# Patient Record
Sex: FEMALE | Race: WHITE | NOT HISPANIC OR LATINO | Employment: FULL TIME | URBAN - NONMETROPOLITAN AREA
[De-identification: names, ages, dates, MRNs, and addresses within clinical notes are randomized per-mention and may not be internally consistent; named-entity substitution may affect disease eponyms.]

---

## 2023-04-10 PROBLEM — E66.9 OBESITY: Status: ACTIVE | Noted: 2023-04-10

## 2023-04-10 PROBLEM — F41.9 ANXIETY: Status: ACTIVE | Noted: 2023-04-10

## 2023-04-10 PROBLEM — R73.9 HYPERGLYCEMIA: Status: ACTIVE | Noted: 2023-04-10

## 2023-04-10 PROBLEM — G56.00 CARPAL TUNNEL SYNDROME: Status: RESOLVED | Noted: 2023-04-10 | Resolved: 2023-04-10

## 2023-04-10 PROBLEM — D68.51 FACTOR V LEIDEN MUTATION (MULTI): Status: ACTIVE | Noted: 2023-04-10

## 2023-04-10 PROBLEM — T78.1XXA FOOD SENSITIVITY WITH GASTROINTESTINAL SYMPTOMS: Status: ACTIVE | Noted: 2023-04-10

## 2023-04-10 PROBLEM — I80.01 THROMBOPHLEBITIS OF SUPERFICIAL VEINS OF RIGHT LOWER EXTREMITY: Status: ACTIVE | Noted: 2023-04-10

## 2023-04-10 PROBLEM — E78.00 HYPERCHOLESTEROLEMIA: Status: ACTIVE | Noted: 2023-04-10

## 2023-04-10 PROBLEM — S62.609A FINGER FRACTURE, RIGHT: Status: ACTIVE | Noted: 2023-04-10

## 2023-04-10 PROBLEM — Z86.32 HISTORY OF GESTATIONAL DIABETES: Status: ACTIVE | Noted: 2023-04-10

## 2023-04-10 RX ORDER — ESCITALOPRAM OXALATE 10 MG/1
10 TABLET ORAL DAILY
COMMUNITY
Start: 2022-07-19 | End: 2023-04-12 | Stop reason: SDUPTHER

## 2023-04-12 ENCOUNTER — OFFICE VISIT (OUTPATIENT)
Dept: PRIMARY CARE | Facility: CLINIC | Age: 38
End: 2023-04-12
Payer: COMMERCIAL

## 2023-04-12 VITALS
BODY MASS INDEX: 36.46 KG/M2 | SYSTOLIC BLOOD PRESSURE: 118 MMHG | HEART RATE: 64 BPM | HEIGHT: 67 IN | DIASTOLIC BLOOD PRESSURE: 80 MMHG | WEIGHT: 232.3 LBS

## 2023-04-12 DIAGNOSIS — F41.9 ANXIETY: ICD-10-CM

## 2023-04-12 DIAGNOSIS — L98.9 LESION OF SKIN OF SCALP: ICD-10-CM

## 2023-04-12 DIAGNOSIS — I80.01 THROMBOPHLEBITIS OF SUPERFICIAL VEINS OF RIGHT LOWER EXTREMITY: ICD-10-CM

## 2023-04-12 DIAGNOSIS — G56.03 BILATERAL CARPAL TUNNEL SYNDROME: Primary | ICD-10-CM

## 2023-04-12 DIAGNOSIS — E66.9 OBESITY (BMI 35.0-39.9 WITHOUT COMORBIDITY): ICD-10-CM

## 2023-04-12 DIAGNOSIS — E78.00 HYPERCHOLESTEROLEMIA: ICD-10-CM

## 2023-04-12 DIAGNOSIS — R73.9 HYPERGLYCEMIA: ICD-10-CM

## 2023-04-12 PROBLEM — S62.609A FINGER FRACTURE, RIGHT: Status: RESOLVED | Noted: 2023-04-10 | Resolved: 2023-04-12

## 2023-04-12 PROCEDURE — 99214 OFFICE O/P EST MOD 30 MIN: CPT | Performed by: INTERNAL MEDICINE

## 2023-04-12 PROCEDURE — 1036F TOBACCO NON-USER: CPT | Performed by: INTERNAL MEDICINE

## 2023-04-12 RX ORDER — ESCITALOPRAM OXALATE 10 MG/1
10 TABLET ORAL DAILY
Qty: 90 TABLET | Refills: 1 | Status: SHIPPED | OUTPATIENT
Start: 2023-04-12 | End: 2023-11-29 | Stop reason: SDUPTHER

## 2023-04-12 ASSESSMENT — ENCOUNTER SYMPTOMS
BACK PAIN: 0
SHORTNESS OF BREATH: 0
PALPITATIONS: 0
FATIGUE: 0
ARTHRALGIAS: 0
BLOOD IN STOOL: 0
VOMITING: 0
ABDOMINAL PAIN: 0
WHEEZING: 0
CHEST TIGHTNESS: 0
FEVER: 0
COUGH: 0
DIARRHEA: 0
NAUSEA: 0

## 2023-04-12 ASSESSMENT — PATIENT HEALTH QUESTIONNAIRE - PHQ9
SUM OF ALL RESPONSES TO PHQ9 QUESTIONS 1 AND 2: 0
2. FEELING DOWN, DEPRESSED OR HOPELESS: NOT AT ALL
1. LITTLE INTEREST OR PLEASURE IN DOING THINGS: NOT AT ALL

## 2023-04-12 NOTE — ASSESSMENT & PLAN NOTE
-She will wear her carpal tunnel splints more consistently especially during the night when she sleeps.  She knows to call if this is not successful in eradicating her symptoms as we would then send her to orthopedics

## 2023-04-12 NOTE — PATIENT INSTRUCTIONS
-I am glad you are doing so well and please remember that we would like to get fasting lab work performed just prior to your next follow-up visit in 6 months  -Please call if the lesion on your scalp is changing in size or characteristics  -Please call with any signs of fevers, rash, or joint pain and swelling  -Please call if your carpal tunnel syndrome does not improve or resolve with wearing the carpal tunnel splints.  We would then refer you to orthopedics

## 2023-04-12 NOTE — PROGRESS NOTES
"Subjective   Patient ID: Cheyanne Patel is a 37 y.o. female who presents for No chief complaint on file..  HPI  She is here today for follow-up.  She reports feeling well.  We did conduct a review of systems.  She has a concern about a small lesion on the back of her scalp which she noticed the other day.  She states she noticed a tick crawling across her sofa and wanted to have a checkup.  She states she did save the tick in a baggy in the freezer in case something happens.  She also has a goodwin retriever and we talked about the tick problem every year as it seems to get worse.  On her scalp it looks like a small pimple and I have asked that she monitor closely.  If she perceives that it is getting larger we could have her see a dermatologist.  We also talked about Lyme's disease and how we generally do not give antibiotics for simple exposure.  She knows the signs and symptoms to watch out for and would contact us right away if anything happens.  She also states that over the weekend she had the \"stomach flu \"but it seems to have completely resolved.  She also reports feeling well on her escitalopram when she is having no signs of depression at this time.  We also discussed her history of thrombophlebitis of her right leg and she is no longer on blood thinner.  She also knows to watch out for any signs or symptoms of blood clot in the future.  We also talked about the challenges of weight loss and I have specifically recommended weight watchers as a tool for long-term success.  Is a discussed doing some lab work prior to her next follow-up visit in approximately 6 months.  She also has some classic features of bilateral carpal tunnel syndrome.  She states she gets numbness in her fingers especially when she wakes up in the morning.  She states is not always present but seems to come and go.  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 "  She states she will go back to wearing her braces more consistently and if its not successful she will call and we will refer her to orthopedic surgery.  Review of Systems   Constitutional:  Negative for fatigue and fever.   Respiratory:  Negative for cough, chest tightness, shortness of breath and wheezing.    Cardiovascular:  Negative for chest pain, palpitations and leg swelling.   Gastrointestinal:  Negative for abdominal pain, blood in stool, diarrhea, nausea and vomiting.   Musculoskeletal:  Negative for arthralgias and back pain.     Objective   Physical Exam  Vitals and nursing note reviewed.   Constitutional:       General: She is not in acute distress.     Appearance: Normal appearance.   HENT:      Head: Normocephalic and atraumatic.   Eyes:      Conjunctiva/sclera: Conjunctivae normal.   Cardiovascular:      Rate and Rhythm: Normal rate and regular rhythm.      Heart sounds: Normal heart sounds.   Pulmonary:      Effort: No respiratory distress.      Breath sounds: No wheezing.   Abdominal:      Palpations: Abdomen is soft.      Tenderness: There is no abdominal tenderness. There is no guarding.   Musculoskeletal:         General: No swelling. Normal range of motion.   Skin:     General: Skin is warm and dry.   Neurological:      General: No focal deficit present.      Mental Status: She is alert and oriented to person, place, and time.   Psychiatric:         Behavior: Behavior normal.       Assessment/Plan   Problem List Items Addressed This Visit          Nervous    Bilateral carpal tunnel syndrome - Primary     -She will wear her carpal tunnel splints more consistently especially during the night when she sleeps.  She knows to call if this is not successful in eradicating her symptoms as we would then send her to orthopedics            Endocrine/Metabolic    Obesity (BMI 35.0-39.9 without comorbidity)       Infectious/Inflammatory    Thrombophlebitis of superficial veins of right lower extremity      -She is no longer on any type of blood thinner but will watch for any signs or symptoms of clot and seek medical attention immediately  -We also discussed increased risk with travel and she knows to get up and move around every 2 hours if she is on an extended car ride or flying            Other    Anxiety     -Doing very well at this time on escitalopram 10 mg daily and we have provided a 6-month refill today         Relevant Medications    escitalopram (Lexapro) 10 mg tablet    Other Relevant Orders    Follow Up In Primary Care    Hypercholesterolemia     -She will get a lipid profile just prior to her next follow-up visit in 6 months         Relevant Orders    Follow Up In Primary Care    Lipid panel    Hyperglycemia     -We will check a fasting glucose and hemoglobin A1c just prior to her next visit in 6 months         Relevant Orders    Follow Up In Primary Care    Basic Metabolic Panel    Hemoglobin A1C    Lesion of skin of scalp     -She will call if the lesion on her scalp is getting bigger or changing and also will call with any signs of fever or rash or myalgias               Amanda Martinez, DO

## 2023-04-12 NOTE — ASSESSMENT & PLAN NOTE
-Doing very well at this time on escitalopram 10 mg daily and we have provided a 6-month refill today

## 2023-04-12 NOTE — ASSESSMENT & PLAN NOTE
-She is no longer on any type of blood thinner but will watch for any signs or symptoms of clot and seek medical attention immediately  -We also discussed increased risk with travel and she knows to get up and move around every 2 hours if she is on an extended car ride or flying

## 2023-04-12 NOTE — ASSESSMENT & PLAN NOTE
-She will call if the lesion on her scalp is getting bigger or changing and also will call with any signs of fever or rash or myalgias

## 2023-09-19 ENCOUNTER — OFFICE VISIT (OUTPATIENT)
Dept: PRIMARY CARE | Facility: CLINIC | Age: 38
End: 2023-09-19
Payer: COMMERCIAL

## 2023-09-19 VITALS
HEART RATE: 74 BPM | BODY MASS INDEX: 37.67 KG/M2 | SYSTOLIC BLOOD PRESSURE: 116 MMHG | HEIGHT: 67 IN | WEIGHT: 240 LBS | OXYGEN SATURATION: 99 % | DIASTOLIC BLOOD PRESSURE: 78 MMHG

## 2023-09-19 DIAGNOSIS — B86 SCABIES: ICD-10-CM

## 2023-09-19 DIAGNOSIS — L30.9 DERMATITIS: Primary | ICD-10-CM

## 2023-09-19 PROCEDURE — 1036F TOBACCO NON-USER: CPT | Performed by: INTERNAL MEDICINE

## 2023-09-19 PROCEDURE — 99213 OFFICE O/P EST LOW 20 MIN: CPT | Performed by: INTERNAL MEDICINE

## 2023-09-19 RX ORDER — AMOXICILLIN AND CLAVULANATE POTASSIUM 875; 125 MG/1; MG/1
1 TABLET, FILM COATED ORAL 2 TIMES DAILY
COMMUNITY
Start: 2023-09-14 | End: 2023-12-08 | Stop reason: WASHOUT

## 2023-09-19 RX ORDER — METHYLPREDNISOLONE 4 MG/1
TABLET ORAL
Qty: 21 TABLET | Refills: 0 | Status: SHIPPED | OUTPATIENT
Start: 2023-09-19 | End: 2023-09-26

## 2023-09-19 RX ORDER — PERMETHRIN 50 MG/G
CREAM TOPICAL ONCE
Qty: 60 G | Refills: 0 | Status: SHIPPED | OUTPATIENT
Start: 2023-09-19 | End: 2023-09-19

## 2023-09-19 ASSESSMENT — ENCOUNTER SYMPTOMS
FATIGUE: 0
NAUSEA: 0
COUGH: 0
WHEEZING: 0
FEVER: 0
VOMITING: 0
PALPITATIONS: 0
SHORTNESS OF BREATH: 0
DIARRHEA: 0

## 2023-09-19 NOTE — PROGRESS NOTES
Subjective   Patient ID: Cheyanne Patel is a 38 y.o. female who presents for No chief complaint on file..  HPI  She is here today for evaluation of a 3-week history of pruritic rash.  She initially thought it was from poison ivy and she states has been very itchy.  She also developed a pustule that looked infected on her right forearm.  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  She completed the Medrol Dosepak but is still taking the Augmentin and she states that clearly her right forearm infection is improving.  It does look like it is healing and scabbing over so clearly she had a bacterial infection on her skin.  She also states that her rash itches a lot at night.  On today's exam she does have patchy distribution but she also has a stippled type pattern in her right groin and right anterior leg region she does have various other lesions on her body.  I told her that it looks like she may have a mite infestation and we talked about the possibility of scabies.  I told her that anybody can get it and I will give her a handout that explains the condition in detail.  I told her to finish her antibiotic for her skin infection and I will give her another round of Medrol Dosepak to hopefully take care of any itching and skin inflammation.  I am also giving her Elimite as a treatment for what I think is scabies and she will let us know if things or not getting better.  Review of Systems   Constitutional:  Negative for fatigue and fever.   Respiratory:  Negative for cough, shortness of breath and wheezing.    Cardiovascular:  Negative for chest pain, palpitations and leg swelling.   Gastrointestinal:  Negative for diarrhea, nausea and vomiting.     Objective   Physical Exam  Vitals and nursing note reviewed.   Constitutional:       General: She is not in acute distress.     Appearance: Normal appearance.   HENT:      Head: Normocephalic and  atraumatic.   Eyes:      Conjunctiva/sclera: Conjunctivae normal.   Cardiovascular:      Rate and Rhythm: Normal rate and regular rhythm.      Heart sounds: Normal heart sounds.   Pulmonary:      Effort: No respiratory distress.      Breath sounds: No wheezing.   Abdominal:      Palpations: Abdomen is soft.      Tenderness: There is no abdominal tenderness. There is no guarding.   Musculoskeletal:         General: No swelling. Normal range of motion.   Skin:     General: Skin is warm and dry.   Neurological:      General: No focal deficit present.      Mental Status: She is alert and oriented to person, place, and time.   Psychiatric:         Behavior: Behavior normal.       Assessment/Plan   Problem List Items Addressed This Visit       Dermatitis - Primary     -I do believe that she had developed a bacterial skin infection and is currently on Augmentin and responding adequately  -I am giving her a Medrol Dosepak in case that she still has inflammation of the skin and hopefully this will help with itching and get rid of the patchy rash that we see         Relevant Medications    methylPREDNISolone (Medrol Dospak) 4 mg tablets    Scabies     -I am giving her a handout today that explains the condition  -I am giving her prescription for Elimite and instructions on how to use this as well as how to take care of her environment to hopefully prevent reinfestation         Relevant Medications    permethrin (Elimite) 5 % cream          Amanda Martinez, DO

## 2023-09-19 NOTE — ASSESSMENT & PLAN NOTE
-I do believe that she had developed a bacterial skin infection and is currently on Augmentin and responding adequately  -I am giving her a Medrol Dosepak in case that she still has inflammation of the skin and hopefully this will help with itching and get rid of the patchy rash that we see

## 2023-09-19 NOTE — PATIENT INSTRUCTIONS
Please read the handout we gave you today  I sent a prescription for 2 items to your pharmacy.  1 is for the Medrol Dosepak which can help with the patchy rash and itching  The other 1 is to help treat a mite infestation so please follow directions on the packaging  Please call me and let me know that you are doing okay and obviously please complete your antibiotic therapy  I also tells individuals that sometimes poison ivy resin can remain on inanimate objects and sometimes people can really contaminate themselves unknowingly.  It is important to wash gloves, clothing, and tools that may have come into contact with poison ivy resin.

## 2023-09-19 NOTE — ASSESSMENT & PLAN NOTE
-I am giving her a handout today that explains the condition  -I am giving her prescription for Elimite and instructions on how to use this as well as how to take care of her environment to hopefully prevent reinfestation

## 2023-10-04 ENCOUNTER — APPOINTMENT (OUTPATIENT)
Dept: PRIMARY CARE | Facility: CLINIC | Age: 38
End: 2023-10-04
Payer: COMMERCIAL

## 2023-10-11 ENCOUNTER — APPOINTMENT (OUTPATIENT)
Dept: PRIMARY CARE | Facility: CLINIC | Age: 38
End: 2023-10-11
Payer: COMMERCIAL

## 2023-11-29 DIAGNOSIS — F41.9 ANXIETY: ICD-10-CM

## 2023-11-29 RX ORDER — ESCITALOPRAM OXALATE 10 MG/1
10 TABLET ORAL DAILY
Qty: 30 TABLET | Refills: 0 | Status: SHIPPED | OUTPATIENT
Start: 2023-11-29 | End: 2023-12-08 | Stop reason: SDUPTHER

## 2023-12-08 ENCOUNTER — OFFICE VISIT (OUTPATIENT)
Dept: PRIMARY CARE | Facility: CLINIC | Age: 38
End: 2023-12-08
Payer: COMMERCIAL

## 2023-12-08 VITALS
WEIGHT: 240 LBS | SYSTOLIC BLOOD PRESSURE: 124 MMHG | HEART RATE: 79 BPM | DIASTOLIC BLOOD PRESSURE: 80 MMHG | OXYGEN SATURATION: 98 % | BODY MASS INDEX: 37.59 KG/M2

## 2023-12-08 DIAGNOSIS — K21.9 GASTROESOPHAGEAL REFLUX DISEASE WITHOUT ESOPHAGITIS: Primary | ICD-10-CM

## 2023-12-08 DIAGNOSIS — E78.00 HYPERCHOLESTEROLEMIA: ICD-10-CM

## 2023-12-08 DIAGNOSIS — F41.9 ANXIETY: ICD-10-CM

## 2023-12-08 DIAGNOSIS — Z86.32 HISTORY OF GESTATIONAL DIABETES: ICD-10-CM

## 2023-12-08 PROBLEM — I80.01 THROMBOPHLEBITIS OF SUPERFICIAL VEINS OF RIGHT LOWER EXTREMITY: Status: RESOLVED | Noted: 2023-04-10 | Resolved: 2023-12-08

## 2023-12-08 PROBLEM — L30.9 DERMATITIS: Status: RESOLVED | Noted: 2023-09-19 | Resolved: 2023-12-08

## 2023-12-08 PROBLEM — B86 SCABIES: Status: RESOLVED | Noted: 2023-09-19 | Resolved: 2023-12-08

## 2023-12-08 PROBLEM — L98.9 LESION OF SKIN OF SCALP: Status: RESOLVED | Noted: 2023-04-12 | Resolved: 2023-12-08

## 2023-12-08 PROCEDURE — 1036F TOBACCO NON-USER: CPT | Performed by: INTERNAL MEDICINE

## 2023-12-08 PROCEDURE — 99213 OFFICE O/P EST LOW 20 MIN: CPT | Performed by: INTERNAL MEDICINE

## 2023-12-08 RX ORDER — ESCITALOPRAM OXALATE 10 MG/1
10 TABLET ORAL DAILY
Qty: 90 TABLET | Refills: 1 | Status: SHIPPED | OUTPATIENT
Start: 2023-12-08 | End: 2024-06-07 | Stop reason: SDUPTHER

## 2023-12-08 ASSESSMENT — ENCOUNTER SYMPTOMS
BACK PAIN: 0
BLOOD IN STOOL: 0
WHEEZING: 0
ROS GI COMMENTS: OCC HEARTBURN
COUGH: 0
ARTHRALGIAS: 0
NAUSEA: 0
ABDOMINAL PAIN: 0
DIARRHEA: 0
VOMITING: 0
FATIGUE: 0
SHORTNESS OF BREATH: 0
PALPITATIONS: 0

## 2023-12-08 ASSESSMENT — PATIENT HEALTH QUESTIONNAIRE - PHQ9
2. FEELING DOWN, DEPRESSED OR HOPELESS: NOT AT ALL
1. LITTLE INTEREST OR PLEASURE IN DOING THINGS: NOT AT ALL
SUM OF ALL RESPONSES TO PHQ9 QUESTIONS 1 AND 2: 0

## 2023-12-08 NOTE — ASSESSMENT & PLAN NOTE
-She will go at her earliest convenience to get a fasting lipid profile and we will contact her with result

## 2023-12-08 NOTE — ASSESSMENT & PLAN NOTE
-I will give her a handout that goes over tips for improving acid reflux  -We discussed trying over-the-counter famotidine

## 2023-12-08 NOTE — PATIENT INSTRUCTIONS
As we discussed I have ordered several labs including a fasting glucose, hemoglobin A1c, and a cholesterol profile.  Please try to go at your earliest convenience fasting and once the results come back I will notify you  We also sent a refill for your Escitalopram to your local pharmacy for 90 days and a refill  The staff will be giving you a handout on acid reflux and the over-the-counter medication I recommend you try is called Pepcid or famotidine.  This can be taken twice daily every day or a could be taken on days when you have symptoms.  Please contact us if your acid reflux becomes severe or frequent and is not controlled by the medication.  Please also call us if you ever have any difficulty swallowing, abdominal pain, or black or bloody stools.

## 2023-12-08 NOTE — PROGRESS NOTES
Subjective   Patient ID: Cheyanne Patel is a 38 y.o. female who presents for Follow-up.  HPI  She is here today for a routine checkup.  We are reminded that she takes escitalopram and she states it has really helped with her sense of wellbeing.  She is under stress however because she is working and has a significant commute to work every day.  She is also raising 2 young children.  She states she is able to take Fridays off but she spends the majority of that time getting the things that she needs to get done just so she can enjoy time with her family on the weekends.  We talked about possibly increasing the dose of escitalopram but for now we do not feel it is necessary.  She will call if things change however.  We also discussed her acid reflux which she does have on occasion when eating certain foods like peanut butter.  We talked about trying over-the-counter famotidine and I will give her another handout that goes over things she can do to improve her reflux.  She reminds me that during her pregnancy she had severe acid reflux and was on omeprazole.  We also are reminded that she did have gestational diabetes so it is important that we check her sugar periodically and she has agreed to go soon to get fasting lab work.  I have agreed to contact her with the results.  She is also struggling with her weight and she states she her dogs on a walk every morning but she does not feel like it is enough.  We talked about looking into weight watchers because it would provide some structure and support and help her come up with a plan to improve her daily lifestyle habits.  She states she will consider it.  Review of Systems   Constitutional:  Negative for fatigue.   Respiratory:  Negative for cough, shortness of breath and wheezing.    Cardiovascular:  Negative for chest pain, palpitations and leg swelling.   Gastrointestinal:  Negative for abdominal pain, blood in stool, diarrhea, nausea and vomiting.        Occ  heartburn   Musculoskeletal:  Negative for arthralgias and back pain.     Objective   Physical Exam  Vitals and nursing note reviewed.   Constitutional:       General: She is not in acute distress.     Appearance: Normal appearance.   HENT:      Head: Normocephalic and atraumatic.   Eyes:      Conjunctiva/sclera: Conjunctivae normal.   Cardiovascular:      Rate and Rhythm: Normal rate and regular rhythm.      Heart sounds: Normal heart sounds.   Pulmonary:      Effort: No respiratory distress.      Breath sounds: No wheezing.   Abdominal:      Palpations: Abdomen is soft.      Tenderness: There is no abdominal tenderness. There is no guarding.   Musculoskeletal:         General: No swelling. Normal range of motion.   Skin:     General: Skin is warm and dry.   Neurological:      General: No focal deficit present.      Mental Status: She is alert and oriented to person, place, and time.   Psychiatric:         Behavior: Behavior normal.       Assessment/Plan   Problem List Items Addressed This Visit             ICD-10-CM    Anxiety F41.9     -Doing well on the escitalopram 10 mg daily         Relevant Medications    escitalopram (Lexapro) 10 mg tablet    History of gestational diabetes Z86.32     -She will go at her earliest convenience to get a fasting glucose and hemoglobin A1c and have agreed to contact her with results         Relevant Orders    Glucose, fasting    Hemoglobin A1C    Hypercholesterolemia E78.00     -She will go at her earliest convenience to get a fasting lipid profile and we will contact her with result         Relevant Orders    Lipid panel    Gastroesophageal reflux disease without esophagitis - Primary K21.9     -I will give her a handout that goes over tips for improving acid reflux  -We discussed trying over-the-counter famotidine               Amanda Martinez,

## 2023-12-08 NOTE — ASSESSMENT & PLAN NOTE
-She will go at her earliest convenience to get a fasting glucose and hemoglobin A1c and have agreed to contact her with results

## 2024-05-21 ENCOUNTER — LAB (OUTPATIENT)
Dept: LAB | Facility: LAB | Age: 39
End: 2024-05-21
Payer: COMMERCIAL

## 2024-05-21 DIAGNOSIS — Z86.32 HISTORY OF GESTATIONAL DIABETES: ICD-10-CM

## 2024-05-21 DIAGNOSIS — E78.00 HYPERCHOLESTEROLEMIA: ICD-10-CM

## 2024-05-21 LAB
CHOLEST SERPL-MCNC: 225 MG/DL (ref 0–199)
CHOLESTEROL/HDL RATIO: 5.8
EST. AVERAGE GLUCOSE BLD GHB EST-MCNC: 117 MG/DL
GLUCOSE P FAST SERPL-MCNC: 92 MG/DL (ref 74–99)
HBA1C MFR BLD: 5.7 %
HDLC SERPL-MCNC: 39 MG/DL
LDLC SERPL CALC-MCNC: 119 MG/DL
NON HDL CHOLESTEROL: 186 MG/DL (ref 0–149)
TRIGL SERPL-MCNC: 335 MG/DL (ref 0–149)
VLDL: 67 MG/DL (ref 0–40)

## 2024-05-21 PROCEDURE — 80061 LIPID PANEL: CPT

## 2024-05-21 PROCEDURE — 82947 ASSAY GLUCOSE BLOOD QUANT: CPT

## 2024-05-21 PROCEDURE — 83036 HEMOGLOBIN GLYCOSYLATED A1C: CPT

## 2024-06-07 ENCOUNTER — OFFICE VISIT (OUTPATIENT)
Dept: PRIMARY CARE | Facility: CLINIC | Age: 39
End: 2024-06-07
Payer: COMMERCIAL

## 2024-06-07 VITALS
SYSTOLIC BLOOD PRESSURE: 112 MMHG | OXYGEN SATURATION: 97 % | HEIGHT: 67 IN | DIASTOLIC BLOOD PRESSURE: 84 MMHG | BODY MASS INDEX: 37.35 KG/M2 | HEART RATE: 80 BPM | WEIGHT: 238 LBS

## 2024-06-07 DIAGNOSIS — E28.2 PCOS (POLYCYSTIC OVARIAN SYNDROME): ICD-10-CM

## 2024-06-07 DIAGNOSIS — D68.51 FACTOR V LEIDEN MUTATION (MULTI): ICD-10-CM

## 2024-06-07 DIAGNOSIS — R73.9 HYPERGLYCEMIA: Primary | ICD-10-CM

## 2024-06-07 DIAGNOSIS — E78.00 HYPERCHOLESTEROLEMIA: ICD-10-CM

## 2024-06-07 DIAGNOSIS — F41.9 ANXIETY: ICD-10-CM

## 2024-06-07 PROBLEM — G56.03 BILATERAL CARPAL TUNNEL SYNDROME: Status: RESOLVED | Noted: 2023-04-12 | Resolved: 2024-06-07

## 2024-06-07 PROCEDURE — 1036F TOBACCO NON-USER: CPT | Performed by: INTERNAL MEDICINE

## 2024-06-07 PROCEDURE — 99214 OFFICE O/P EST MOD 30 MIN: CPT | Performed by: INTERNAL MEDICINE

## 2024-06-07 RX ORDER — ESCITALOPRAM OXALATE 10 MG/1
10 TABLET ORAL DAILY
Qty: 90 TABLET | Refills: 1 | Status: SHIPPED | OUTPATIENT
Start: 2024-06-07

## 2024-06-07 ASSESSMENT — ENCOUNTER SYMPTOMS
FATIGUE: 0
BACK PAIN: 0
ARTHRALGIAS: 0
NAUSEA: 0
CHEST TIGHTNESS: 0
ABDOMINAL PAIN: 0
WHEEZING: 0
BLOOD IN STOOL: 0
COUGH: 0
SHORTNESS OF BREATH: 0
DIARRHEA: 0
PALPITATIONS: 0
VOMITING: 0

## 2024-06-07 NOTE — ASSESSMENT & PLAN NOTE
-Unfortunately her hemoglobin A1c is elevated at 5.7  -We discussed-the notion of a prediabetic state and ways to spencer off diabetes in the future.  She would like to return in 4 months for another hemoglobin A1c

## 2024-06-07 NOTE — ASSESSMENT & PLAN NOTE
-Unfortunately numbers are higher than before-she will work on dietary modification as well as exercise and weight loss and she would like to return in 4 months for reevaluation

## 2024-06-07 NOTE — ASSESSMENT & PLAN NOTE
-We discussed the use of metformin but she would like to refrain from medication at this time  -She is encouraged to discuss any hormonal concerns that she has with her gynecologist  -She understands that exercise and weight loss can help this condition dramatically

## 2024-06-07 NOTE — PATIENT INSTRUCTIONS
As we discussed we have decided to have you return in 4 months for reevaluation and please remember to get fasting lab work done prior to that visit  -I am very impressed that you have lost 4 pounds in the recent past and keep up the great work.  I also recommend weight watchers if you would like more structure and accountability  Please talk to your gynecologist about any hormonal concerns that you have

## 2024-06-07 NOTE — PROGRESS NOTES
Subjective   Patient ID: Cheyanne Patel is a 39 y.o. female who presents for Follow-up.  HPI  She is here today for her routine checkup.  Her blood pressure is excellent.  We did conduct a full review of systems.  We discussed her anxiety and she states she has been doing well on her escitalopram.  We talked about a trial weaning in the future but I told her that she would not need to get off the medicine if she still needs it.  We also went over the results of her lab work and unfortunately her hemoglobin A1c had gone up to 5.7.  Also her cholesterol is high including her triglycerides.  She states that very recently she has decided to embark on healthier lifestyle practices and has been watching her diet more closely and trying to become more active.  She is already lost 4 pounds.  She understands that the sugar and cholesterol can be improved dramatically through diet and exercise.  We briefly mention medication.  In fact she has been diagnosed with PCOS and I told her that we could place her on metformin today.  She naturally wants to go drug-free and I agree unless we do not get control of things.  She also wondered about hormonal evaluation but I told her that she is not a candidate for hormone replacement therapy so there would be no sense in checking the hormones.  She is encouraged to discuss her hormonal concerns with her gynecologist however.  She also suffers from factor V Leiden mutation and she is doing well at this time.  We decided that she will return in 4 months for reevaluation after she has had the opportunity to work on her diet and exercise routine and I have recommended weight watchers as a way to stay on track.  Review of Systems   Constitutional:  Negative for fatigue.   Respiratory:  Negative for cough, chest tightness, shortness of breath and wheezing.    Cardiovascular:  Negative for chest pain, palpitations and leg swelling.   Gastrointestinal:  Negative for abdominal pain, blood in  stool, diarrhea, nausea and vomiting.   Musculoskeletal:  Negative for arthralgias and back pain.     Objective   Physical Exam  Vitals and nursing note reviewed.   Constitutional:       General: She is not in acute distress.     Appearance: Normal appearance.   HENT:      Head: Normocephalic and atraumatic.   Eyes:      Conjunctiva/sclera: Conjunctivae normal.   Cardiovascular:      Rate and Rhythm: Normal rate and regular rhythm.      Heart sounds: Normal heart sounds.   Pulmonary:      Effort: No respiratory distress.      Breath sounds: No wheezing.   Abdominal:      Palpations: Abdomen is soft.      Tenderness: There is no abdominal tenderness. There is no guarding.   Musculoskeletal:         General: No swelling. Normal range of motion.   Skin:     General: Skin is warm and dry.   Neurological:      General: No focal deficit present.      Mental Status: She is alert and oriented to person, place, and time.   Psychiatric:         Behavior: Behavior normal.       Recent Results (from the past 672 hour(s))   Glucose, fasting    Collection Time: 05/21/24  6:42 AM   Result Value Ref Range    Glucose, Fasting 92 74 - 99 mg/dL   Hemoglobin A1C    Collection Time: 05/21/24  6:42 AM   Result Value Ref Range    Hemoglobin A1C 5.7 (H) see below %    Estimated Average Glucose 117 Not Established mg/dL   Lipid panel    Collection Time: 05/21/24  6:42 AM   Result Value Ref Range    Cholesterol 225 (H) 0 - 199 mg/dL    HDL-Cholesterol 39.0 mg/dL    Cholesterol/HDL Ratio 5.8     LDL Calculated 119 (H) <=99 mg/dL    VLDL 67 (H) 0 - 40 mg/dL    Triglycerides 335 (H) 0 - 149 mg/dL    Non HDL Cholesterol 186 (H) 0 - 149 mg/dL       Assessment/Plan   Problem List Items Addressed This Visit             ICD-10-CM    Anxiety F41.9     -Doing very well on her escitalopram 10 mg and we are providing a refill today         Relevant Medications    escitalopram (Lexapro) 10 mg tablet    Factor V Leiden mutation (Multi) D68.51     -Doing  well and has had no clotting events  -We will continue to monitor closely         Hypercholesterolemia E78.00     -Unfortunately numbers are higher than before-she will work on dietary modification as well as exercise and weight loss and she would like to return in 4 months for reevaluation           Relevant Orders    Lipid panel    Hyperglycemia - Primary R73.9     -Unfortunately her hemoglobin A1c is elevated at 5.7  -We discussed-the notion of a prediabetic state and ways to spencer off diabetes in the future.  She would like to return in 4 months for another hemoglobin A1c         Relevant Orders    Hemoglobin A1C    PCOS (polycystic ovarian syndrome) E28.2     -We discussed the use of metformin but she would like to refrain from medication at this time  -She is encouraged to discuss any hormonal concerns that she has with her gynecologist  -She understands that exercise and weight loss can help this condition dramatically               Amanda Martinez, DO

## 2024-07-15 ENCOUNTER — TELEPHONE (OUTPATIENT)
Dept: PRIMARY CARE | Facility: CLINIC | Age: 39
End: 2024-07-15
Payer: COMMERCIAL

## 2024-07-15 NOTE — TELEPHONE ENCOUNTER
Patient called in wondering if a sleep study order can be put in or if she needs to be seen first to discuss this. Her  told her she snores all throughout her sleep and gasps for breaths.

## 2024-07-15 NOTE — TELEPHONE ENCOUNTER
Please advise that she would need to be seen first.  I would be happy to see her for this concern.  Thank you

## 2024-08-29 DIAGNOSIS — F41.9 ANXIETY: ICD-10-CM

## 2024-08-29 RX ORDER — ESCITALOPRAM OXALATE 10 MG/1
10 TABLET ORAL DAILY
Qty: 90 TABLET | Refills: 1 | Status: SHIPPED | OUTPATIENT
Start: 2024-08-29

## 2024-10-25 ENCOUNTER — APPOINTMENT (OUTPATIENT)
Dept: PRIMARY CARE | Facility: CLINIC | Age: 39
End: 2024-10-25
Payer: COMMERCIAL

## 2024-12-18 ENCOUNTER — LAB (OUTPATIENT)
Dept: LAB | Facility: LAB | Age: 39
End: 2024-12-18
Payer: COMMERCIAL

## 2024-12-18 DIAGNOSIS — R73.9 HYPERGLYCEMIA: ICD-10-CM

## 2024-12-18 DIAGNOSIS — E78.00 HYPERCHOLESTEROLEMIA: ICD-10-CM

## 2024-12-18 LAB
CHOLEST SERPL-MCNC: 208 MG/DL (ref 0–199)
CHOLESTEROL/HDL RATIO: 4.6
EST. AVERAGE GLUCOSE BLD GHB EST-MCNC: 108 MG/DL
HBA1C MFR BLD: 5.4 %
HDLC SERPL-MCNC: 45 MG/DL
LDLC SERPL CALC-MCNC: 124 MG/DL
NON HDL CHOLESTEROL: 163 MG/DL (ref 0–149)
TRIGL SERPL-MCNC: 194 MG/DL (ref 0–149)
VLDL: 39 MG/DL (ref 0–40)

## 2024-12-18 PROCEDURE — 83036 HEMOGLOBIN GLYCOSYLATED A1C: CPT

## 2024-12-18 PROCEDURE — 36415 COLL VENOUS BLD VENIPUNCTURE: CPT

## 2024-12-18 PROCEDURE — 80061 LIPID PANEL: CPT

## 2024-12-20 ENCOUNTER — APPOINTMENT (OUTPATIENT)
Age: 39
End: 2024-12-20
Payer: COMMERCIAL

## 2024-12-20 VITALS
HEART RATE: 77 BPM | DIASTOLIC BLOOD PRESSURE: 80 MMHG | HEIGHT: 67 IN | BODY MASS INDEX: 37.59 KG/M2 | OXYGEN SATURATION: 98 % | SYSTOLIC BLOOD PRESSURE: 126 MMHG | WEIGHT: 239.5 LBS

## 2024-12-20 DIAGNOSIS — E78.00 HYPERCHOLESTEROLEMIA: ICD-10-CM

## 2024-12-20 DIAGNOSIS — J06.9 UPPER RESPIRATORY TRACT INFECTION, UNSPECIFIED TYPE: Primary | ICD-10-CM

## 2024-12-20 DIAGNOSIS — R06.83 SNORING: ICD-10-CM

## 2024-12-20 DIAGNOSIS — R73.9 HYPERGLYCEMIA: ICD-10-CM

## 2024-12-20 PROCEDURE — 3008F BODY MASS INDEX DOCD: CPT | Performed by: INTERNAL MEDICINE

## 2024-12-20 PROCEDURE — 99214 OFFICE O/P EST MOD 30 MIN: CPT | Performed by: INTERNAL MEDICINE

## 2024-12-20 PROCEDURE — 1036F TOBACCO NON-USER: CPT | Performed by: INTERNAL MEDICINE

## 2024-12-20 RX ORDER — OMEGA-3-ACID ETHYL ESTERS 1 G/1
1 CAPSULE, LIQUID FILLED ORAL 2 TIMES DAILY
Qty: 60 CAPSULE | Refills: 11 | Status: SHIPPED | OUTPATIENT
Start: 2024-12-20 | End: 2025-12-20

## 2024-12-20 RX ORDER — AMOXICILLIN 875 MG/1
875 TABLET, FILM COATED ORAL 2 TIMES DAILY
Qty: 20 TABLET | Refills: 0 | Status: SHIPPED | OUTPATIENT
Start: 2024-12-20 | End: 2024-12-30

## 2024-12-20 ASSESSMENT — ENCOUNTER SYMPTOMS
ABDOMINAL PAIN: 0
UNEXPECTED WEIGHT CHANGE: 0
SINUS PRESSURE: 0
VOMITING: 0
BACK PAIN: 0
FATIGUE: 0
DIARRHEA: 0
ARTHRALGIAS: 0
SHORTNESS OF BREATH: 0
PALPITATIONS: 0
BLOOD IN STOOL: 0
NAUSEA: 0
SORE THROAT: 1
WHEEZING: 0
COUGH: 1
CHEST TIGHTNESS: 0

## 2024-12-20 NOTE — ASSESSMENT & PLAN NOTE
-She has had significant improvement with her numbers  -We will try to add a fish oil derivative and she will call if she has any problems getting the preparation or has significant side effects  -We will otherwise see her back in 6 months with another lipid profile

## 2024-12-20 NOTE — PROGRESS NOTES
Subjective   Patient ID: Cheyanne Patel is a 39 y.o. female who presents for Snoring ([facility] ) and Cough (Since thanksgiving).  She is here today for a checkup but also she has had some upper respiratory symptoms for the past 2-1/2 weeks.  She has had a cough and some nasal congestion with postnasal drip.  Despite the passage of time her symptoms do not appear to be clearing and she is producing discolored mucus.  We decided that we will have her start an antibiotic and she will call if her condition does not clear.  We also conducted a full review of systems.  She states her  has noticed that she is snoring very loudly at night and also pausing with breathing.  She does have several risk factors for sleep apnea and therefore we have decided to order the test.  I will contact her once the results are known.  She has been working very hard with her diet and based on her recent lab work we see some significant improvement.  Her hemoglobin A1c was fantastic this time at 5.4.  Her cholesterol profile improved significantly although she still has elevated levels.  I have recommended we try a simple fish oil and I am sending a prescription.  If it is not covered I told her to have the pharmacist show her an over-the-counter equivalent preparation.  If everything goes according to plan we will see her back in 6 months for another evaluation with lab work.  Review of Systems   Constitutional:  Negative for fatigue and unexpected weight change.   HENT:  Positive for congestion, postnasal drip and sore throat. Negative for ear pain, sinus pressure and sneezing.    Respiratory:  Positive for cough. Negative for chest tightness, shortness of breath and wheezing.    Cardiovascular:  Negative for chest pain, palpitations and leg swelling.   Gastrointestinal:  Negative for abdominal pain, blood in stool, diarrhea, nausea and vomiting.   Musculoskeletal:  Negative for arthralgias and back pain.     Objective    Physical Exam  Vitals and nursing note reviewed.   Constitutional:       General: She is not in acute distress.     Appearance: Normal appearance.   HENT:      Head: Normocephalic and atraumatic.   Eyes:      Conjunctiva/sclera: Conjunctivae normal.   Cardiovascular:      Rate and Rhythm: Normal rate and regular rhythm.      Heart sounds: Normal heart sounds.   Pulmonary:      Effort: No respiratory distress.      Breath sounds: No wheezing.   Abdominal:      Palpations: Abdomen is soft.      Tenderness: There is no abdominal tenderness. There is no guarding.   Musculoskeletal:         General: No swelling. Normal range of motion.   Skin:     General: Skin is warm and dry.   Neurological:      General: No focal deficit present.      Mental Status: She is alert and oriented to person, place, and time.   Psychiatric:         Behavior: Behavior normal.       Recent Results (from the past 4 weeks)   Hemoglobin A1C    Collection Time: 12/18/24  6:24 AM   Result Value Ref Range    Hemoglobin A1C 5.4 See comment %    Estimated Average Glucose 108 Not Established mg/dL   Lipid panel    Collection Time: 12/18/24  6:24 AM   Result Value Ref Range    Cholesterol 208 (H) 0 - 199 mg/dL    HDL-Cholesterol 45.0 mg/dL    Cholesterol/HDL Ratio 4.6     LDL Calculated 124 (H) <=99 mg/dL    VLDL 39 0 - 40 mg/dL    Triglycerides 194 (H) 0 - 149 mg/dL    Non HDL Cholesterol 163 (H) 0 - 149 mg/dL       Assessment/Plan   Problem List Items Addressed This Visit             ICD-10-CM    Hypercholesterolemia E78.00     -She has had significant improvement with her numbers  -We will try to add a fish oil derivative and she will call if she has any problems getting the preparation or has significant side effects  -We will otherwise see her back in 6 months with another lipid profile         Relevant Medications    omega-3 acid ethyl esters (Lovaza) 1 gram capsule    Other Relevant Orders    Lipid Panel    Hyperglycemia R73.9     -She has had  a great improvement with her hemoglobin A1c and we will continue to monitor         Relevant Orders    Basic Metabolic Panel    Hemoglobin A1C    Upper respiratory tract infection - Primary J06.9     -She has had symptoms for 2-1/2 weeks and her evaluation shows that she is needing an antibiotic         Relevant Medications    amoxicillin (Amoxil) 875 mg tablet    Snoring R06.83     -Her  has observed snoring and pausing with breathing  -She wakes up tired and sometimes has a very dry mouth or headache  -Based on her risk factors we are ordering a sleep study and have agreed to contact her with the results         Relevant Orders    Home sleep apnea test (HSAT)     Other Visit Diagnoses         Codes    BMI 37.0-37.9, adult     Z68.37    Relevant Orders    Home sleep apnea test (HSAT)        Pt Instructions  As we discussed I sent an antibiotic to your pharmacy called amoxicillin and please take this twice a day with food for the next 10 days.  I also recommend that you take plain guaifenesin also known as Mucinex.  This medicine helps thin the mucus so that it clears more effectively from your nasal passages.  Please call if you feel like your condition is worsening as opposed to getting better  I also sent a fish oil derivative to your pharmacy and if it is not covered on your plan well then asked the pharmacist to help you pick out a similar supplement  You will be getting a call about scheduling a sleep study and once results are known I will call you  If everything goes according to plan we will see you back in 6 months for another checkup and please remember to get fasting lab work done prior to that visit         Amanda Martinez, DO

## 2024-12-20 NOTE — ASSESSMENT & PLAN NOTE
-Her  has observed snoring and pausing with breathing  -She wakes up tired and sometimes has a very dry mouth or headache  -Based on her risk factors we are ordering a sleep study and have agreed to contact her with the results

## 2024-12-20 NOTE — PATIENT INSTRUCTIONS
Pt Instructions  As we discussed I sent an antibiotic to your pharmacy called amoxicillin and please take this twice a day with food for the next 10 days.  I also recommend that you take plain guaifenesin also known as Mucinex.  This medicine helps thin the mucus so that it clears more effectively from your nasal passages.  Please call if you feel like your condition is worsening as opposed to getting better  I also sent a fish oil derivative to your pharmacy and if it is not covered on your plan well then asked the pharmacist to help you pick out a similar supplement  You will be getting a call about scheduling a sleep study and once results are known I will call you  If everything goes according to plan we will see you back in 6 months for another checkup and please remember to get fasting lab work done prior to that visit

## 2025-01-16 ENCOUNTER — APPOINTMENT (OUTPATIENT)
Dept: SLEEP MEDICINE | Facility: HOSPITAL | Age: 40
End: 2025-01-16
Payer: COMMERCIAL

## 2025-03-24 DIAGNOSIS — F41.9 ANXIETY: ICD-10-CM

## 2025-04-10 ENCOUNTER — CLINICAL SUPPORT (OUTPATIENT)
Dept: SLEEP MEDICINE | Facility: HOSPITAL | Age: 40
End: 2025-04-10
Payer: COMMERCIAL

## 2025-04-10 DIAGNOSIS — R06.83 SNORING: ICD-10-CM

## 2025-04-10 DIAGNOSIS — F41.9 ANXIETY: ICD-10-CM

## 2025-04-10 PROCEDURE — 9420000001 HC RT PATIENT EDUCATION 5 MIN

## 2025-04-10 RX ORDER — ESCITALOPRAM OXALATE 10 MG/1
10 TABLET ORAL DAILY
Qty: 90 TABLET | Refills: 1 | OUTPATIENT
Start: 2025-04-10

## 2025-04-10 RX ORDER — ESCITALOPRAM OXALATE 10 MG/1
10 TABLET ORAL DAILY
Qty: 100 TABLET | Refills: 0 | Status: SHIPPED | OUTPATIENT
Start: 2025-04-10

## 2025-04-10 NOTE — PROGRESS NOTES
Type of Study: HOME SLEEP STUDY - NOMAD     The patient received equipment and instructions for use of the [employer] Nomad HSAT device. The patient was instructed how to apply the effort belts, cannula, thermistor. It was also explained how the Nomad and oximeter components work.  The patient was asked to record their sleep for an 8-hour period.     The patient was informed of their responsibility for the device and acknowledged this by signing the HSAT device contract. The patient was asked to return the device on the next day and was shown where the drop off box was located in the hospital.  The patient was also given written instructions and a troubleshooting guide for the HSAT device.     After the procedure, the patient/family was informed to follow up with ordering clinician for testing results.

## 2025-05-05 ENCOUNTER — TELEPHONE (OUTPATIENT)
Age: 40
End: 2025-05-05
Payer: COMMERCIAL

## 2025-05-05 DIAGNOSIS — R06.83 SNORING: Primary | ICD-10-CM

## 2025-06-20 ENCOUNTER — APPOINTMENT (OUTPATIENT)
Age: 40
End: 2025-06-20
Payer: COMMERCIAL

## 2025-06-20 VITALS
SYSTOLIC BLOOD PRESSURE: 132 MMHG | WEIGHT: 244.2 LBS | HEIGHT: 67 IN | OXYGEN SATURATION: 97 % | BODY MASS INDEX: 38.33 KG/M2 | DIASTOLIC BLOOD PRESSURE: 82 MMHG | HEART RATE: 61 BPM

## 2025-06-20 DIAGNOSIS — R73.9 HYPERGLYCEMIA: ICD-10-CM

## 2025-06-20 DIAGNOSIS — E78.00 HYPERCHOLESTEROLEMIA: ICD-10-CM

## 2025-06-20 DIAGNOSIS — E28.2 PCOS (POLYCYSTIC OVARIAN SYNDROME): Primary | ICD-10-CM

## 2025-06-20 DIAGNOSIS — G47.33 SEVERE OBSTRUCTIVE SLEEP APNEA: ICD-10-CM

## 2025-06-20 DIAGNOSIS — F41.9 ANXIETY: ICD-10-CM

## 2025-06-20 PROBLEM — D68.51 FACTOR V LEIDEN MUTATION (MULTI): Status: RESOLVED | Noted: 2023-04-10 | Resolved: 2025-06-20

## 2025-06-20 PROBLEM — R06.83 SNORING: Status: RESOLVED | Noted: 2024-12-20 | Resolved: 2025-06-20

## 2025-06-20 PROBLEM — J06.9 UPPER RESPIRATORY TRACT INFECTION: Status: RESOLVED | Noted: 2024-12-20 | Resolved: 2025-06-20

## 2025-06-20 PROCEDURE — 3008F BODY MASS INDEX DOCD: CPT | Performed by: INTERNAL MEDICINE

## 2025-06-20 PROCEDURE — 1036F TOBACCO NON-USER: CPT | Performed by: INTERNAL MEDICINE

## 2025-06-20 PROCEDURE — 99214 OFFICE O/P EST MOD 30 MIN: CPT | Performed by: INTERNAL MEDICINE

## 2025-06-20 RX ORDER — METFORMIN HYDROCHLORIDE 500 MG/1
500 TABLET, EXTENDED RELEASE ORAL
Qty: 30 TABLET | Refills: 5 | Status: SHIPPED | OUTPATIENT
Start: 2025-06-20 | End: 2026-07-25

## 2025-06-20 RX ORDER — OMEGA-3-ACID ETHYL ESTERS 1 G/1
1 CAPSULE, LIQUID FILLED ORAL 2 TIMES DAILY
Qty: 200 CAPSULE | Refills: 1 | Status: SHIPPED | OUTPATIENT
Start: 2025-06-20 | End: 2026-06-20

## 2025-06-20 RX ORDER — ESCITALOPRAM OXALATE 10 MG/1
10 TABLET ORAL DAILY
Qty: 100 TABLET | Refills: 1 | Status: SHIPPED | OUTPATIENT
Start: 2025-06-20

## 2025-06-20 ASSESSMENT — ENCOUNTER SYMPTOMS
BACK PAIN: 0
DIARRHEA: 0
VOMITING: 0
BLOOD IN STOOL: 0
SHORTNESS OF BREATH: 0
COUGH: 0
ABDOMINAL PAIN: 0
WHEEZING: 0
ARTHRALGIAS: 0
PALPITATIONS: 0
NAUSEA: 0

## 2025-06-20 NOTE — PATIENT INSTRUCTIONS
Patient instructions  As we discussed I am pleased that you are using your CPAP device and please troubleshoot with your respiratory therapist if you are having problems with the equipment.  You are also welcome to call here with any questions or concerns  As you are well aware I sent a prescription to your pharmacy for the drug called metformin and take this once a day in the morning.  Please call if you have any unusual symptoms or problems  I also sent a handout to you via [employer] explaining PCOS  We decided to have you return in 3 months and just prior to that visit please remember to go for fasting lab work as we will be checking your cholesterol and your blood sugar

## 2025-06-20 NOTE — ASSESSMENT & PLAN NOTE
- She will continue with her fish oil prescription and in 3 months she knows to go to the lab just prior to her follow-up visit here

## 2025-06-20 NOTE — ASSESSMENT & PLAN NOTE
- We will try metformin  mg daily and she will call if she is having any unusual symptoms  -When she returns in 3 months we are checking a fasting glucose and hemoglobin A1c

## 2025-06-20 NOTE — ASSESSMENT & PLAN NOTE
- She reports using her CPAP device for the past 3 weeks for greater than 4 hours a night and she is deriving benefit from its use

## 2025-06-20 NOTE — PROGRESS NOTES
Subjective   Patient ID: Cheyanne Patel is a 40 y.o. female who presents for Follow-up (6 MO CK).  HPI  She is here today for follow-up.  Since her last visit she was able to complete her sleep study and indeed she did have evidence of moderate to severe obstructive sleep apnea.  She has her CPAP device and has been using it for 3 weeks.  She states she does not like it but she does feel better.  She states she has been able to keep it on for 4 hours or more night.  She is having some issues with the mask and is waiting for a replacement.  We discussed troubleshooting with her respiratory therapist if she is having any issues with the equipment.  I am very pleased that she is using the CPAP device.  We also discussed her diagnosis of PCOS.  She states that she would like to try and be proactive with treatment.  I am sending her a handout via [employer] that outlines various treatments.  I have suggested she may benefit from taking metformin and we will give her the extended release preparation.  She will call if she is not tolerating this medication.  We briefly discussed other treatments but she would not be a candidate for oral contraceptives.  She has been diagnosed with heterozygous positive factor V Leiden mutation but does not show any active disease.  We also discussed checking lab work but decided to wait 3 months after she has been on her metformin.  She is also been taking the fish oil derivatives and I am hoping to see even more improvement on her cholesterol in 3 months.  We also discussed possibly seeing a nutritionist.  She is doing well on her Lexapro and we are providing a refill today.  Review of Systems   Respiratory:  Negative for cough, shortness of breath and wheezing.    Cardiovascular:  Negative for chest pain, palpitations and leg swelling.   Gastrointestinal:  Negative for abdominal pain, blood in stool, diarrhea, nausea and vomiting.   Musculoskeletal:  Negative for arthralgias and back  pain.     Objective   Physical Exam  Vitals and nursing note reviewed.   Constitutional:       General: She is not in acute distress.     Appearance: Normal appearance.   HENT:      Head: Normocephalic and atraumatic.   Eyes:      Conjunctiva/sclera: Conjunctivae normal.   Cardiovascular:      Rate and Rhythm: Normal rate and regular rhythm.      Heart sounds: Normal heart sounds.   Pulmonary:      Effort: No respiratory distress.      Breath sounds: No wheezing.   Abdominal:      Palpations: Abdomen is soft.      Tenderness: There is no abdominal tenderness. There is no guarding.   Musculoskeletal:         General: No swelling. Normal range of motion.   Skin:     General: Skin is warm and dry.   Neurological:      General: No focal deficit present.      Mental Status: She is alert and oriented to person, place, and time.   Psychiatric:         Behavior: Behavior normal.       Assessment/Plan   Problem List Items Addressed This Visit           ICD-10-CM    Anxiety F41.9    - Doing well on her medication and we are providing refills today         Relevant Medications    escitalopram (Lexapro) 10 mg tablet    Hypercholesterolemia E78.00    - She will continue with her fish oil prescription and in 3 months she knows to go to the lab just prior to her follow-up visit here         Relevant Medications    omega-3 acid ethyl esters (Lovaza) 1 gram capsule    Other Relevant Orders    Lipid Panel    Hyperglycemia R73.9    Relevant Orders    Basic Metabolic Panel    Hemoglobin A1C    PCOS (polycystic ovarian syndrome) - Primary E28.2    - We will try metformin  mg daily and she will call if she is having any unusual symptoms  -When she returns in 3 months we are checking a fasting glucose and hemoglobin A1c         Relevant Medications    metFORMIN XR (Glucophage-XR) 500 mg 24 hr tablet    Severe obstructive sleep apnea G47.33    - She reports using her CPAP device for the past 3 weeks for greater than 4 hours a night  and she is deriving benefit from its use        Patient instructions  As we discussed I am pleased that you are using your CPAP device and please troubleshoot with your respiratory therapist if you are having problems with the equipment.  You are also welcome to call here with any questions or concerns  As you are well aware I sent a prescription to your pharmacy for the drug called metformin and take this once a day in the morning.  Please call if you have any unusual symptoms or problems  I also sent a handout to you via [employer] explaining PCOS  We decided to have you return in 3 months and just prior to that visit please remember to go for fasting lab work as we will be checking your cholesterol and your blood sugar       Amanda Martinez, DO

## 2025-09-12 ENCOUNTER — APPOINTMENT (OUTPATIENT)
Age: 40
End: 2025-09-12
Payer: COMMERCIAL

## 2025-09-16 ENCOUNTER — APPOINTMENT (OUTPATIENT)
Age: 40
End: 2025-09-16
Payer: COMMERCIAL